# Patient Record
Sex: FEMALE | Race: WHITE | NOT HISPANIC OR LATINO | RURAL
[De-identification: names, ages, dates, MRNs, and addresses within clinical notes are randomized per-mention and may not be internally consistent; named-entity substitution may affect disease eponyms.]

---

## 2017-08-29 ENCOUNTER — OFFICE VISIT (OUTPATIENT)
Dept: RETAIL CLINIC | Facility: CLINIC | Age: 52
End: 2017-08-29

## 2017-08-29 DIAGNOSIS — R50.9 FEVER, UNSPECIFIED FEVER CAUSE: Primary | ICD-10-CM

## 2017-08-29 DIAGNOSIS — J40 BRONCHITIS: ICD-10-CM

## 2017-08-29 PROCEDURE — 99213 OFFICE O/P EST LOW 20 MIN: CPT | Performed by: NURSE PRACTITIONER

## 2017-08-29 RX ORDER — PREDNISONE 1 MG/1
TABLET ORAL
Qty: 21 TABLET | Refills: 0 | Status: SHIPPED | OUTPATIENT
Start: 2017-08-29 | End: 2017-09-03

## 2017-08-29 RX ORDER — AZITHROMYCIN 250 MG/1
TABLET, FILM COATED ORAL
Qty: 6 TABLET | Refills: 0 | Status: SHIPPED | OUTPATIENT
Start: 2017-08-29 | End: 2019-05-02

## 2017-08-29 NOTE — PROGRESS NOTES
Subjective   Yanira Nieto is a 52 y.o. female.   There were no vitals taken for this visit.      Cough   This is a new problem. The current episode started in the past 7 days. The problem has been gradually worsening. The problem occurs every few minutes. The cough is productive of purulent sputum. Associated symptoms include chills, ear congestion, a fever, nasal congestion, postnasal drip, rhinorrhea, a sore throat (mild), shortness of breath and wheezing. Pertinent negatives include no chest pain, ear pain, headaches, heartburn, hemoptysis, myalgias, rash, sweats or weight loss.        The following portions of the patient's history were reviewed and updated as appropriate: allergies, current medications, past family history, past medical history, past social history, past surgical history and problem list.    Review of Systems   Constitutional: Positive for chills and fever. Negative for weight loss.   HENT: Positive for postnasal drip, rhinorrhea and sore throat (mild). Negative for ear pain.    Respiratory: Positive for cough, shortness of breath and wheezing. Negative for hemoptysis.    Cardiovascular: Negative for chest pain.   Gastrointestinal: Negative for heartburn.   Musculoskeletal: Negative for myalgias.   Skin: Negative for rash.   Neurological: Negative for headaches.       Objective   Physical Exam   Constitutional: She appears well-developed and well-nourished.  Non-toxic appearance. She appears ill.   HENT:   Head: Normocephalic and atraumatic.   Right Ear: Tympanic membrane and ear canal normal.   Left Ear: Tympanic membrane and ear canal normal.   Nose: Mucosal edema and rhinorrhea present. Right sinus exhibits no maxillary sinus tenderness and no frontal sinus tenderness. Left sinus exhibits no maxillary sinus tenderness and no frontal sinus tenderness.   Cardiovascular: Regular rhythm and normal heart sounds.    Pulmonary/Chest: Effort normal. She has wheezes. She has rhonchi. She has no  rales.   Lymphadenopathy:     She has no cervical adenopathy.   Skin: Skin is warm and dry.       Assessment/Plan   Diagnoses and all orders for this visit:    Fever, unspecified fever cause    Bronchitis    Other orders  -     azithromycin (ZITHROMAX Z-NAHED) 250 MG tablet; Take 2 tablets the first day, then 1 tablet daily for 4 days.  -     predniSONE (DELTASONE) 5 MG tablet; 6/5/4/3/2/1

## 2019-05-02 ENCOUNTER — OFFICE VISIT (OUTPATIENT)
Dept: RETAIL CLINIC | Facility: CLINIC | Age: 54
End: 2019-05-02

## 2019-05-02 VITALS
OXYGEN SATURATION: 100 % | WEIGHT: 129.4 LBS | TEMPERATURE: 97 F | BODY MASS INDEX: 20.79 KG/M2 | SYSTOLIC BLOOD PRESSURE: 112 MMHG | DIASTOLIC BLOOD PRESSURE: 76 MMHG | RESPIRATION RATE: 12 BRPM | HEART RATE: 60 BPM | HEIGHT: 66 IN

## 2019-05-02 DIAGNOSIS — R09.81 SINUS CONGESTION: Primary | ICD-10-CM

## 2019-05-02 DIAGNOSIS — J02.9 SORETHROAT: ICD-10-CM

## 2019-05-02 LAB
EXPIRATION DATE: NORMAL
INTERNAL CONTROL: NORMAL
Lab: NORMAL
S PYO AG THROAT QL: NEGATIVE

## 2019-05-02 PROCEDURE — 99213 OFFICE O/P EST LOW 20 MIN: CPT | Performed by: NURSE PRACTITIONER

## 2019-05-02 PROCEDURE — 87880 STREP A ASSAY W/OPTIC: CPT | Performed by: NURSE PRACTITIONER

## 2019-05-02 RX ORDER — PREDNISONE 10 MG/1
TABLET ORAL DAILY
Qty: 21 EACH | Refills: 0 | Status: SHIPPED | OUTPATIENT
Start: 2019-05-02 | End: 2019-05-08

## 2019-05-02 RX ORDER — PSEUDOEPHEDRINE HCL 120 MG/1
120 TABLET, FILM COATED, EXTENDED RELEASE ORAL EVERY 12 HOURS
Qty: 20 TABLET | Refills: 0 | Status: SHIPPED | OUTPATIENT
Start: 2019-05-02 | End: 2019-05-12

## 2019-05-02 NOTE — PROGRESS NOTES
"Rodríguez Nieto is a 54 y.o. female.     Sore Throat    This is a new problem. Episode onset: 2 days. The problem has been rapidly worsening. There has been no fever. The pain is severe. Associated symptoms include congestion, a hoarse voice and trouble swallowing. Pertinent negatives include no abdominal pain, coughing, diarrhea, ear pain, headaches, neck pain, shortness of breath, stridor, swollen glands or vomiting. She has had no exposure to strep or mono. She has tried NSAIDs for the symptoms. The treatment provided mild relief.        The following portions of the patient's history were reviewed and updated as appropriate: allergies, current medications, past medical history, past social history, past surgical history and problem list.    Review of Systems   Constitutional: Negative for appetite change, fatigue and fever.   HENT: Positive for congestion, hoarse voice, postnasal drip, rhinorrhea, sore throat and trouble swallowing. Negative for ear pain, sinus pressure and sneezing.    Eyes: Negative.    Respiratory: Negative.  Negative for cough, shortness of breath, wheezing and stridor.    Cardiovascular: Negative.    Gastrointestinal: Negative for abdominal pain, diarrhea, nausea and vomiting.   Musculoskeletal: Negative.  Negative for myalgias and neck pain.   Skin: Negative.    Neurological: Negative for headaches.   Hematological: Negative for adenopathy.   Psychiatric/Behavioral: Negative.         /76   Pulse 60   Temp 97 °F (36.1 °C)   Resp 12   Ht 167.6 cm (66\")   Wt 58.7 kg (129 lb 6.4 oz)   SpO2 100%   BMI 20.89 kg/m²      Objective   Physical Exam   Constitutional: She is oriented to person, place, and time. Vital signs are normal. She appears well-developed and well-nourished. No distress.   HENT:   Head: Normocephalic.   Right Ear: External ear and ear canal normal. No drainage, swelling or tenderness. Tympanic membrane is bulging. Tympanic membrane is not erythematous. "   Left Ear: External ear and ear canal normal. No drainage, swelling or tenderness. Tympanic membrane is bulging. Tympanic membrane is not erythematous.   Nose: Mucosal edema (severe) and rhinorrhea present. Right sinus exhibits no maxillary sinus tenderness and no frontal sinus tenderness. Left sinus exhibits no maxillary sinus tenderness and no frontal sinus tenderness.   Mouth/Throat: Uvula is midline and mucous membranes are normal. Posterior oropharyngeal erythema present. Tonsils are 0 on the right. Tonsils are 0 on the left. No tonsillar exudate.   Neck: Normal range of motion. Neck supple.   Cardiovascular: Normal rate, regular rhythm, S1 normal, S2 normal and normal heart sounds.   Pulmonary/Chest: Effort normal and breath sounds normal. No stridor. No respiratory distress. She has no decreased breath sounds. She has no wheezes. She has no rhonchi. She has no rales.   Abdominal: Soft. Bowel sounds are normal. She exhibits no distension. There is no tenderness. There is no rebound and no guarding.   Lymphadenopathy:        Head (right side): No tonsillar adenopathy present.        Head (left side): No tonsillar adenopathy present.     She has no cervical adenopathy.   Neurological: She is alert and oriented to person, place, and time.   Skin: Skin is warm and dry. No rash noted. She is not diaphoretic.   Psychiatric: She has a normal mood and affect. Her speech is normal and behavior is normal. Thought content normal.   Vitals reviewed.       Results for orders placed or performed in visit on 05/02/19   POC Rapid Strep A   Result Value Ref Range    Rapid Strep A Screen Negative Negative, VALID, INVALID, Not Performed    Internal Control Passed Passed    Lot Number [id number]     Expiration Date 08/31/2020         Assessment/Plan   Yanira was seen today for sore throat.    Diagnoses and all orders for this visit:    Sinus congestion  -     pseudoephedrine (SUDAFED) 120 MG 12 hr tablet; Take 1 tablet by mouth  Every 12 (Twelve) Hours for 10 days.    Sorethroat  -     POC Rapid Strep A  -     predniSONE (DELTASONE) 10 MG (21) tablet pack; Take  by mouth Daily for 6 days. Use as directed on package

## 2019-12-18 ENCOUNTER — OFFICE VISIT (OUTPATIENT)
Dept: RETAIL CLINIC | Facility: CLINIC | Age: 54
End: 2019-12-18

## 2019-12-18 VITALS
WEIGHT: 134 LBS | RESPIRATION RATE: 16 BRPM | HEART RATE: 94 BPM | BODY MASS INDEX: 21.53 KG/M2 | TEMPERATURE: 99.4 F | OXYGEN SATURATION: 99 % | HEIGHT: 66 IN

## 2019-12-18 DIAGNOSIS — R05.3 COUGH, PERSISTENT: ICD-10-CM

## 2019-12-18 DIAGNOSIS — J98.01 BRONCHOSPASM: Primary | ICD-10-CM

## 2019-12-18 LAB
EXPIRATION DATE: NORMAL
FLUAV AG NPH QL: NEGATIVE
FLUBV AG NPH QL: NEGATIVE
INTERNAL CONTROL: NORMAL
Lab: NORMAL

## 2019-12-18 PROCEDURE — 99213 OFFICE O/P EST LOW 20 MIN: CPT | Performed by: NURSE PRACTITIONER

## 2019-12-18 PROCEDURE — 87804 INFLUENZA ASSAY W/OPTIC: CPT | Performed by: NURSE PRACTITIONER

## 2019-12-18 RX ORDER — AZITHROMYCIN 250 MG/1
TABLET, FILM COATED ORAL
Qty: 6 TABLET | Refills: 0 | Status: SHIPPED | OUTPATIENT
Start: 2019-12-18

## 2019-12-18 RX ORDER — PREDNISONE 10 MG/1
TABLET ORAL
Qty: 21 TABLET | Refills: 0 | Status: SHIPPED | OUTPATIENT
Start: 2019-12-18

## 2019-12-18 RX ORDER — ALBUTEROL SULFATE 90 UG/1
2 AEROSOL, METERED RESPIRATORY (INHALATION) EVERY 4 HOURS PRN
Qty: 1 INHALER | Refills: 0 | Status: SHIPPED | OUTPATIENT
Start: 2019-12-18

## 2019-12-18 NOTE — PROGRESS NOTES
"Rodríguez Nieto is a 54 y.o. female.   Pulse 94   Temp 99.4 °F (37.4 °C)   Resp 16   Ht 167.6 cm (66\")   Wt 60.8 kg (134 lb)   SpO2 99%   BMI 21.63 kg/m²   Past Medical History:   Diagnosis Date   • Allergic        Allergies   Allergen Reactions   • Penicillins Hives         URI    This is a new problem. The current episode started in the past 7 days. The problem has been gradually worsening. There has been no fever. Associated symptoms include congestion, coughing, rhinorrhea and wheezing. Pertinent negatives include no abdominal pain, chest pain, diarrhea, dysuria, ear pain, headaches, joint pain, joint swelling, nausea, neck pain, plugged ear sensation, rash, sinus pain, sneezing, sore throat, swollen glands or vomiting.        The following portions of the patient's history were reviewed and updated as appropriate: allergies, current medications, past family history, past medical history, past social history, past surgical history and problem list.    Review of Systems   HENT: Positive for congestion and rhinorrhea. Negative for ear pain, sinus pain, sneezing and sore throat.    Respiratory: Positive for cough and wheezing.    Cardiovascular: Negative for chest pain.   Gastrointestinal: Negative for abdominal pain, diarrhea, nausea and vomiting.   Genitourinary: Negative for dysuria.   Musculoskeletal: Negative for joint pain and neck pain.   Skin: Negative for rash.   Neurological: Negative for headaches.       Objective   Physical Exam   Constitutional: She appears well-developed and well-nourished.  Non-toxic appearance. She appears ill.   HENT:   Head: Normocephalic and atraumatic.   Right Ear: Tympanic membrane and ear canal normal.   Left Ear: Tympanic membrane and ear canal normal.   Nose: Mucosal edema and rhinorrhea present. Right sinus exhibits no maxillary sinus tenderness and no frontal sinus tenderness. Left sinus exhibits no maxillary sinus tenderness and no frontal sinus " tenderness.   Mouth/Throat: Uvula is midline, oropharynx is clear and moist and mucous membranes are normal.   Cardiovascular: Regular rhythm and normal heart sounds.   Pulmonary/Chest: Effort normal. She has no wheezes. She has no rhonchi. She has no rales.   Lymphadenopathy:     She has no cervical adenopathy.   Skin: Skin is warm and dry.       Assessment/Plan   Diagnoses and all orders for this visit:    Bronchospasm  -     POC Influenza A / B    Cough, persistent    Other orders  -     predniSONE (DELTASONE) 10 MG tablet; 6/5/4/3/2/1 As directed PO  -     albuterol sulfate  (90 Base) MCG/ACT inhaler; Inhale 2 puffs Every 4 (Four) Hours As Needed for Wheezing or Shortness of Air.  -     azithromycin (ZITHROMAX Z-NAHED) 250 MG tablet; Take 2 tablets the first day, then 1 tablet daily for 4 days.      Results for orders placed or performed in visit on 12/18/19   POC Influenza A / B   Result Value Ref Range    Rapid Influenza A Ag Negative Negative    Rapid Influenza B Ag Negative Negative    Internal Control Passed Passed    Lot Number [id number]     Expiration Date 6/21          Pt advised to start the prednisone and inhaler today. ONLY start the azithromycin if symptoms worsen or do not improve after 7-10 days.

## 2019-12-18 NOTE — PATIENT INSTRUCTIONS
Bronchospasm, Adult    Bronchospasm is when airways in the lungs get smaller. When this happens, it can be hard to breathe. You may cough. You may also make a whistling sound when you breathe (wheeze).  Follow these instructions at home:  Medicines  · Take over-the-counter and prescription medicines only as told by your doctor.  · If you need to use an inhaler or nebulizer to take your medicine, ask your doctor how to use it.  · If you were given a spacer, always use it with your inhaler.  Lifestyle  · Change your heating and air conditioning filter. Do this at least once a month.  · Try not to use fireplaces and wood stoves.  · Do not  smoke. Do not  allow smoking in your home.  · Try not to use things that have a strong smell, like perfume.  · Get rid of pests (such as roaches and mice) and their poop.  · Remove any mold from your home.  · Keep your house clean. Get rid of dust.  · Use cleaning products that have no smell.   · Replace carpet with wood, tile, or vinyl dianna.  · Use allergy-proof pillows, mattress covers, and box spring covers.  · Wash bed sheets and blankets every week. Use hot water. Dry them in a dryer.  · Use blankets that are made of polyester or cotton.  · Wash your hands often.  · Keep pets out of your bedroom.  · When you exercise, try not to breathe in cold air.  General instructions  · Have a plan for getting medical care. Know these things:  ? When to call your doctor.  ? When to call local emergency services (911 in the [place]  ? Where to go in an emergency.  · Stay up to date on your shots (immunizations).  · When you have an episode:  ? Stay calm.  ? Relax.  ? Breathe slowly.  Contact a doctor if:  · Your muscles ache.  · Your chest hurts.  · The color of the mucus you cough up (sputum) changes from clear or white to yellow, green, gray, or bloody.  · The mucus you cough up gets thicker.  · You have a fever.  Get help right away if:  · The whistling sound gets worse, even after you  take your medicines.  · Your coughing gets worse.  · You find it even harder to breathe.  · Your chest hurts very much.  Summary  · Bronchospasm is when airways in the lungs get smaller.  · When this happens, it can be hard to breathe. You may cough. You may also make a whistling sound when you breathe.  · Stay away from things that cause you to have episodes. These include smoke or dust.  This information is not intended to replace advice given to you by your health care provider. Make sure you discuss any questions you have with your health care provider.  Document Released: 10/15/2010 Document Revised: 12/21/2017 Document Reviewed: 12/21/2017  [employer] Interactive Patient Education © 2019 [employer]    Asthma, Adult    Asthma is a long-term (chronic) condition that causes recurrent episodes in which the airways become tight and narrow. The airways are the passages that lead from the nose and mouth down into the lungs. Asthma episodes, also called asthma attacks, can cause coughing, wheezing, shortness of breath, and chest pain. The airways can also fill with mucus. During an attack, it can be difficult to breathe. Asthma attacks can range from minor to life threatening.  Asthma cannot be cured, but medicines and lifestyle changes can help control it and treat acute attacks.  What are the causes?  This condition is believed to be caused by inherited (genetic) and environmental factors, but its exact cause is not known.  There are many things that can bring on an asthma attack or make asthma symptoms worse (triggers). Asthma triggers are different for each person. Common triggers include:  · Mold.  · Dust.  · Cigarette smoke.  · Cockroaches.  · Things that can cause allergy symptoms (allergens), such as animal dander or pollen from trees or grass.  · Air pollutants such as household , wood smoke, smog, or chemical odors.  · Cold air, weather changes, and winds (which increase molds and pollen in the  air).  · Strong emotional expressions such as crying or laughing hard.  · Stress.  · Certain medicines (such as aspirin) or types of medicines (such as beta-blockers).  · Sulfites in foods and drinks. Foods and drinks that may contain sulfites include dried fruit, potato chips, and sparkling grape juice.  · Infections or inflammatory conditions such as the flu, a cold, or inflammation of the nasal membranes (rhinitis).  · Gastroesophageal reflux disease (GERD).  · Exercise or strenuous activity.  What are the signs or symptoms?  Symptoms of this condition may occur right after asthma is triggered or many hours later. Symptoms include:  · Wheezing. This can sound like whistling when you breathe.  · Excessive nighttime or early morning coughing.  · Frequent or severe coughing with a common cold.  · Chest tightness.  · Shortness of breath.  · Tiredness (fatigue) with minimal activity.  How is this diagnosed?  This condition is diagnosed based on:  · Your medical history.  · A physical exam.  · Tests, which may include:  ? Lung function studies and pulmonary studies (spirometry). These tests can evaluate the flow of air in your lungs.  ? Allergy tests.  ? Imaging tests, such as X-rays.  How is this treated?  There is no cure for this condition, but treatment can help control your symptoms. Treatment for asthma usually involves:  · Identifying and avoiding your asthma triggers.  · Using medicines to control your symptoms. Generally, two types of medicines are used to treat asthma:  ? Controller medicines. These help prevent asthma symptoms from occurring. They are usually taken every day.  ? Fast-acting reliever or rescue medicines. These quickly relieve asthma symptoms by widening the narrow and tight airways. They are used as needed and provide short-term relief.  · Using supplemental oxygen. This may be needed during a severe episode.  · Using other medicines, such as:  ? Allergy medicines, such as antihistamines, if  your asthma attacks are triggered by allergens.  ? Immune medicines (immunomodulators). These are medicines that help control the immune system.  · Creating an asthma action plan. An asthma action plan is a written plan for managing and treating your asthma attacks. This plan includes:  ? A list of your asthma triggers and how to avoid them.  ? Information about when medicines should be taken and when their dosage should be changed.  ? Instructions about using a device called a peak flow meter. A peak flow meter measures how well the lungs are working and the severity of your asthma. It helps you monitor your condition.  Follow these instructions at home:  Controlling your home environment  Control your home environment in the following ways to help avoid triggers and prevent asthma attacks:  · Change your heating and air conditioning filter regularly.  · Limit your use of fireplaces and wood stoves.  · Get rid of pests (such as roaches and mice) and their droppings.  · Throw away plants if you see mold on them.  · Clean floors and dust surfaces regularly. Use unscented cleaning products.  · Try to have someone else vacuum for you regularly. Stay out of rooms while they are being vacuumed and for a short while afterward. If you vacuum, use a dust mask from a hardware store, a double-layered or microfilter vacuum  bag, or a vacuum  with a HEPA filter.  · Replace carpet with wood, tile, or vinyl dianna. Carpet can trap dander and dust.  · Use allergy-proof pillows, mattress covers, and box spring covers.  · Keep your bedroom a trigger-free room.  · Avoid pets and keep windows closed when allergens are in the air.  · Wash beddings every week in hot water and dry them in a dryer.  · Use blankets that are made of polyester or cotton.  · Clean bathrooms and deangelo with bleach. If possible, have someone repaint the walls in these rooms with mold-resistant paint. Stay out of the rooms that are being  cleaned and painted.  · Wash your hands often with soap and water. If soap and water are not available, use hand .  · Do not allow anyone to smoke in your home.  General instructions  · Take over-the-counter and prescription medicines only as told by your health care provider.  ? Speak with your health care provider if you have questions about how or when to take the medicines.  ? Make note if you are requiring more frequent dosages.  · Do not use any products that contain nicotine or tobacco, such as cigarettes and e-cigarettes. If you need help quitting, ask your health care provider. Also, avoid being exposed to secondhand smoke.  · Use a peak flow meter as told by your health care provider. Record and keep track of the readings.  · Understand and use the asthma action plan to help minimize, or stop an asthma attack, without needing to seek medical care.  · Make sure you stay up to date on your yearly vaccinations as told by your health care provider. This may include vaccines for the flu and pneumonia.  · Avoid outdoor activities when allergen counts are high and when air quality is low.  · Wear a ski mask that covers your nose and mouth during outdoor winter activities. Exercise indoors on cold days if you can.  · Warm up before exercising, and take time for a cool-down period after exercise.  · Keep all follow-up visits as told by your health care provider. This is important.  Where to find more information  · For information about asthma, turn to the Centers for Disease Control and Prevention at www.cdc.gov/asthma/faqs.htm  · For air quality information, turn to [employer] at https://airnow.gov/  Contact a health care provider if:  · You have wheezing, shortness of breath, or a cough even while you are taking medicine to prevent attacks.  · The mucus you cough up (sputum) is thicker than usual.  · Your sputum changes from clear or white to yellow, green, gray, or bloody.  · Your medicines are causing  side effects, such as a rash, itching, swelling, or trouble breathing.  · You need to use a reliever medicine more than 2-3 times a week.  · Your peak flow reading is still at 50-79% of your personal best after following your action plan for 1 hour.  · You have a fever.  Get help right away if:  · You are getting worse and do not respond to treatment during an asthma attack.  · You are short of breath when at rest or when doing very little physical activity.  · You have difficulty eating, drinking, or talking.  · You have chest pain or tightness.  · You develop a fast heartbeat or palpitations.  · You have a bluish color to your lips or fingernails.  · You are light-headed or dizzy, or you faint.  · Your peak flow reading is less than 50% of your personal best.  · You feel too tired to breathe normally.  Summary  · Asthma is a long-term (chronic) condition that causes recurrent episodes in which the airways become tight and narrow. These episodes can cause coughing, wheezing, shortness of breath, and chest pain.  · Asthma cannot be cured, but medicines and lifestyle changes can help control it and treat acute attacks.  · Make sure you understand how to avoid triggers and how and when to use your medicines.  · Asthma attacks can range from minor to life threatening. Get help right away if you have an asthma attack and do not respond to treatment with your usual rescue medicines.  This information is not intended to replace advice given to you by your health care provider. Make sure you discuss any questions you have with your health care provider.  Document Released: 12/18/2006 Document Revised: 01/22/2018 Document Reviewed: 01/22/2018  [employer] Interactive Patient Education © 2019 [employer]

## 2020-11-11 ENCOUNTER — HOSPITAL ENCOUNTER (EMERGENCY)
Age: 55
Discharge: HOME | End: 2020-11-11
Payer: COMMERCIAL

## 2020-11-11 VITALS — BODY MASS INDEX: 21.7 KG/M2

## 2020-11-11 VITALS
OXYGEN SATURATION: 97 % | RESPIRATION RATE: 19 BRPM | DIASTOLIC BLOOD PRESSURE: 98 MMHG | HEART RATE: 102 BPM | TEMPERATURE: 98.42 F | SYSTOLIC BLOOD PRESSURE: 126 MMHG

## 2020-11-11 VITALS
DIASTOLIC BLOOD PRESSURE: 98 MMHG | TEMPERATURE: 98.4 F | OXYGEN SATURATION: 97 % | HEART RATE: 102 BPM | RESPIRATION RATE: 19 BRPM | SYSTOLIC BLOOD PRESSURE: 126 MMHG

## 2020-11-11 DIAGNOSIS — R05: Primary | ICD-10-CM

## 2020-11-11 DIAGNOSIS — Z20.828: ICD-10-CM

## 2020-11-11 DIAGNOSIS — R50.9: ICD-10-CM

## 2020-11-11 PROCEDURE — U0004 COV-19 TEST NON-CDC HGH THRU: HCPCS

## 2020-11-11 PROCEDURE — 99201: CPT

## 2023-02-10 PROCEDURE — 93010 ELECTROCARDIOGRAM REPORT: CPT | Performed by: INTERNAL MEDICINE

## 2023-02-23 ENCOUNTER — OUTSIDE FACILITY SERVICE (OUTPATIENT)
Dept: CARDIOLOGY | Facility: CLINIC | Age: 58
End: 2023-02-23
Payer: COMMERCIAL

## 2024-05-16 ENCOUNTER — OFFICE VISIT (OUTPATIENT)
Dept: CARDIAC SURGERY | Facility: CLINIC | Age: 59
End: 2024-05-16
Payer: COMMERCIAL

## 2024-05-16 VITALS
TEMPERATURE: 99.3 F | HEIGHT: 66 IN | OXYGEN SATURATION: 95 % | HEART RATE: 90 BPM | DIASTOLIC BLOOD PRESSURE: 90 MMHG | SYSTOLIC BLOOD PRESSURE: 140 MMHG | BODY MASS INDEX: 21.63 KG/M2

## 2024-05-16 DIAGNOSIS — R05.3 CHRONIC COUGH: Primary | ICD-10-CM

## 2024-05-16 DIAGNOSIS — R49.0 HOARSENESS: ICD-10-CM

## 2024-05-16 PROCEDURE — 99203 OFFICE O/P NEW LOW 30 MIN: CPT | Performed by: THORACIC SURGERY (CARDIOTHORACIC VASCULAR SURGERY)

## 2024-05-16 RX ORDER — MONTELUKAST SODIUM 10 MG/1
1 TABLET ORAL DAILY
COMMUNITY
Start: 2024-03-16

## 2024-05-16 RX ORDER — LOSARTAN POTASSIUM 50 MG/1
50 TABLET ORAL DAILY
COMMUNITY

## 2024-05-16 NOTE — PROGRESS NOTES
05/16/2024  Patient Information  Yanira Nieto                                                                                          [address] [facility] [address]   1965  'PCP/Referring Physician'  Alton Melton MD  [phone]  No ref. provider found    Chief Complaint   Patient presents with    Consult     NP per Dr. Melton for Chronic Cough, Hoarseness, Left Hand Numbness and Myalgais-Hx of Histoplasmosis       History of Present Illness:   The patient is a 59-year-old  female who has a long history of chronic cough and also has hoarseness.  She has had a history of histoplasmosis.  She does not smoke.  She also carries a diagnosis of asthma and has been on Singulair in the past.  She is getting very tired and not being able to speak without coughing.  She is here for evaluation today.  She denies hemoptysis, weight loss and she denies exposure to infectious disease.  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  She has no family history of anything like this.  She does have a history of COVID several months ago.      There is no problem list on file for this patient.    Past Medical History:   Diagnosis Date    Allergic     Asthma     Cough     Histoplasmosis     History of blood transfusion     Hypertension     Hypothyroidism     Mitral valve prolapse     Skin cancer      Past Surgical History:   Procedure Laterality Date    ADENOIDECTOMY      CHOLECYSTECTOMY      DILATION AND CURETTAGE, DIAGNOSTIC / THERAPEUTIC      ESSURE TUBAL LIGATION      TONSILLECTOMY         Current Outpatient Medications:     albuterol sulfate  (90 Base) MCG/ACT inhaler, Inhale 2 puffs Every 4 (Four) Hours As Needed for Wheezing or Shortness of Air., Disp: 1 inhaler, Rfl: 0    Fluticasone-Salmeterol (ADVAIR HFA IN), Inhale., Disp: , Rfl:     losartan (COZAAR) 50 MG tablet, Take 1 tablet by mouth Daily., Disp: , Rfl:     montelukast (SINGULAIR) 10 MG tablet, Take 1 tablet by mouth  Daily., Disp: , Rfl:     azithromycin (ZITHROMAX Z-NAHED) 250 MG tablet, Take 2 tablets the first day, then 1 tablet daily for 4 days. (Patient not taking: Reported on 5/16/2024), Disp: 6 tablet, Rfl: 0    predniSONE (DELTASONE) 10 MG tablet, 6/5/4/3/2/1 As directed PO (Patient not taking: Reported on 5/16/2024), Disp: 21 tablet, Rfl: 0  Allergies   Allergen Reactions    Penicillins Hives     Social History     Socioeconomic History    Marital status: Unknown    Number of children: 4   Tobacco Use    Smoking status: Never    Smokeless tobacco: Never   Vaping Use    Vaping status: Never Used   Substance and Sexual Activity    Alcohol use: Yes     Comment: socially    Drug use: Never     Family History   Problem Relation Age of Onset    Coronary artery disease Mother     Hypertension Mother     Heart disease Mother     Coronary artery disease Father     Heart disease Father      Review of Systems   Constitutional: Positive for malaise/fatigue. Negative for chills, fever, night sweats and weight loss.   HENT:  Negative for hearing loss, odynophagia and sore throat.    Cardiovascular:  Positive for dyspnea on exertion and palpitations. Negative for chest pain, leg swelling and orthopnea.   Respiratory:  Positive for cough and shortness of breath. Negative for hemoptysis.    Endocrine: Negative for cold intolerance, heat intolerance, polydipsia, polyphagia and polyuria.   Hematologic/Lymphatic: Bruises/bleeds easily.   Skin:  Negative for itching and rash.   Musculoskeletal:  Positive for back pain, joint pain and myalgias. Negative for joint swelling.   Gastrointestinal:  Positive for constipation. Negative for abdominal pain, diarrhea, hematemesis, hematochezia, melena, nausea and vomiting.   Genitourinary:  Positive for flank pain (left sided recently). Negative for dysuria, frequency and hematuria.   Neurological:  Negative for focal weakness, headaches, numbness and seizures.   Psychiatric/Behavioral:  Negative for  "suicidal ideas.    All other systems reviewed and are negative.    Vitals:    05/16/24 1112   BP: 140/90   BP Location: Left arm   Patient Position: Sitting   Pulse: 90   Temp: 99.3 °F (37.4 °C)   SpO2: 95%   Height: 167.6 cm (66\")      Physical Exam  Vitals and nursing note reviewed.   Constitutional:       General: She is not in acute distress.     Appearance: She is well-developed.   HENT:      Head: Normocephalic.   Eyes:      Conjunctiva/sclera: Conjunctivae normal.      Pupils: Pupils are equal, round, and reactive to light.   Neck:      Thyroid: No thyroid mass or thyromegaly.   Cardiovascular:      Rate and Rhythm: Normal rate.      Heart sounds: No murmur heard.     No friction rub. No gallop.   Pulmonary:      Breath sounds: No wheezing, rhonchi or rales.   Abdominal:      General: Bowel sounds are normal. There is no distension.      Palpations: Abdomen is soft. There is no mass.      Tenderness: There is no abdominal tenderness.   Musculoskeletal:         General: No deformity. Normal range of motion.      Cervical back: Normal range of motion.   Skin:     Findings: No petechiae.      Nails: There is no clubbing.   Neurological:      Mental Status: She is oriented to person, place, and time.      Cranial Nerves: No cranial nerve deficit.      Sensory: No sensory deficit.   Psychiatric:         Behavior: Behavior normal.         The ROS, past medical history, surgical history, family history, social history, and vitals were reviewed by myself and corrected as needed.      Labs/Imaging:  I have obtained and reviewed the medical records from Dr. Melton.    Assessment/Plan:   The patient is a 59-year-old female who has had a long history of coughing and hoarseness.  She apparently has been told she has asthma in the past and has been placed on Singulair.  She does have a history of having COVID.  She is getting so that she can barely talk because of her hoarseness and coughing when she tries to talk.  She, " apparently, was seen by an ENT doctor in [place] and was told she did not have polyps on her cords.  At this point, after history and examination, I think the best option would be to go and have allergy testing and see an ENT here.  If this does not illuminate the problem then I would suggest we refer her to pulmonary that would require bronchoscopy and other testing.  I explained all of this to her in detail and answered all of her questions.  She is happy with this plan.  The patient does not have an advance directive on file at this time.  I would like to thank you for this consultation.    There is no problem list on file for this patient.      CC: MD Concha Dooley editing for Gordon Arroyo MD       I, Gordon Arroyo MD, have read and agree with the editing done by Concha Perez, .

## 2024-05-23 ENCOUNTER — TELEPHONE (OUTPATIENT)
Dept: CARDIAC SURGERY | Facility: CLINIC | Age: 59
End: 2024-05-23

## 2024-05-23 NOTE — TELEPHONE ENCOUNTER
Caller: Imlay, Dana    Relationship: Self    Best call back number: [phone]     What is the best time to reach you: ANY    Who are you requesting to speak with (clinical staff, provider,  specific staff member): REFERRAL STAFF    Do you know the name of the person who called: PT    What was the call regarding: PT WAS IN OFFICE 5/16/24-DR GOMEZ ADVISED SHE NEEDED TO SEE ENT W/I BH AND ALLERGY TEST. SHE HAS NOT HEARD ANYTHING-NO REF IN [facility]. PLEASE CALL PT WHEN APPT ARE MADE AND/OR MAXINE.    Is it okay if the provider responds through [facility]: NO  MAY LEAVE A MESSAGE IF NO ANSWER

## 2024-05-24 NOTE — TELEPHONE ENCOUNTER
LVM FOR PT. REFERRAL WAS SENT 05/17/24. [facility] VERIFIED THAT THEY RECEIVED THE REFERRAL AND ARE WORKING ON IT.

## 2024-05-29 NOTE — TELEPHONE ENCOUNTER
Caller: Yanira Nieto    Relationship: Self    Best call back number: [phone]     What is the best time to reach you: ANY     Who are you requesting to speak with (clinical staff, provider,  specific staff member): ANY     Do you know the name of the person who called: BERTA     What was the call regarding: SCHEDULING     Is it okay if the provider responds through MyChart: PATIENT WOULD LIKE A CALL BACK.

## 2024-06-10 ENCOUNTER — DOCUMENTATION (OUTPATIENT)
Dept: SLEEP MEDICINE | Age: 59
End: 2024-06-10
Payer: COMMERCIAL

## 2024-06-10 NOTE — PROGRESS NOTES
Received call from Dr. Melton  Consult needed for possible atrial fibrillation  Message to schedulers.

## 2024-06-18 NOTE — PROGRESS NOTES
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, Nancy Bernard  Previous cardiology: RASHIDA Tucker (~20 years)  PROBLEM LIST:   Palpitations  HTN  HLD  History of mitral valve prolapse  History of histoplasmosis   Chronic cough/hoarseness  Negative ENT eval   CXR: no acute findings  Asthma  COVID early   Hypothyroidism     Surgical history:  Tonsillectomy/adenoidectomy   Cholecystectomy   Tubal ligation  C section  D&C    Cardiac Risk Factors: family history of premature cardiovascular disease, hypertension, and sedentary lifestyle    CC:  Chief Complaint   Patient presents with    Irregular Heart Beat     New Patient       Allergies  Allergies   Allergen Reactions    Penicillins Hives       Current Medications  Current Outpatient Medications   Medication Instructions    albuterol sulfate  (90 Base) MCG/ACT inhaler 2 puffs, Inhalation, Every 4 Hours PRN    Fluticasone-Salmeterol (ADVAIR HFA IN) Inhalation, As Needed    losartan (COZAAR) 50 mg, Oral, Daily    montelukast (SINGULAIR) 10 MG tablet 1 tablet, Oral, Daily        History of Present Illness   HPI  Yanira Nieto is a 59 y.o. year old female with the above mentioned PMH who presents for consult from Alton Melton MD for evaluation of tachycardia.  She had previous evaluation with Dr. Tucker 20+ years ago and was told of MVP.  She was also placed on atenolol for tachycardia at that time.  She did not like how the atenolol made her feel and this was discontinued.  She wears an Apple Watch that tells her heart rate is elevated and at times rhythm is A-fib. Episodes occur daily and last several minutes.  She has no associated chest pain, shortness of breath, and/or tachypalpitations. She does not  "follow home blood pressures but was placed on losartan 1 year ago.  She can occasionally feel her heart beating in her years when she lays down at night.  She denies any exertional symptoms.  She admits to not drinking enough water.  No excess caffeine or alcohol.  No symptoms suggesting AVINASH.  Family history is significant for atrial fibrillation, valve disease, heart disease.  She is also dealt with chronic \"hoarseness\" and cough and has further evaluation scheduled with ENT.    Pt denies any chest pain, dyspnea at rest, dyspnea on exertion, orthopnea, PND, palpitations, lower extremity edema, or claudication. Pt denies history of CHF, DVT, PE, MI, CVA, TIA, or rheumatic fever.     ROS  Review of Systems   HENT:  Positive for hoarse voice.    Cardiovascular:  Positive for irregular heartbeat.        Fast heart rate   Respiratory:  Positive for cough.    Endocrine: Positive for polydipsia.   All other systems reviewed and are negative.      SOCIAL HX  Social History     Socioeconomic History    Marital status: Single    Number of children: 4   Tobacco Use    Smoking status: Never    Smokeless tobacco: Never   Vaping Use    Vaping status: Never Used   Substance and Sexual Activity    Alcohol use: Yes     Comment: socially    Drug use: Never       FAMILY HX  Family History   Problem Relation Age of Onset    Coronary artery disease Mother     Hypertension Mother     Heart disease Mother     Coronary artery disease Father     Heart disease Father        Vitals:    06/19/24 1027 06/19/24 1128   BP: 142/98 (!) 160/108   BP Location: Left arm Left arm   Patient Position: Sitting Sitting   Pulse: 94    SpO2: 98%    Weight: 63.5 kg (140 lb)    Height: 167.6 cm (66\")      Body mass index is 22.6 kg/m².     PHYSICAL EXAMINATION:  Constitutional:       Appearance: Normal and healthy appearance. Well-developed, normal weight and not in distress.   Neck:      Thyroid: Thyroid normal.   Pulmonary:      Effort: Pulmonary effort is " normal.      Breath sounds: Normal breath sounds.   Cardiovascular:      PMI at left midclavicular line. Normal rate. Regular rhythm. Normal S1. Normal S2.       Murmurs: There is no murmur.      No gallop.  No rub.   Pulses:     Intact distal pulses.   Edema:     Peripheral edema absent.   Skin:     General: Skin is warm and dry.   Neurological:      General: No focal deficit present.      Mental Status: Alert.   Psychiatric:         Behavior: Behavior is cooperative.         Diagnostic Data:    ECG 12 Lead    Date/Time: 6/19/2024 11:53 AM  Performed by: Keshia Casey APRN    Authorized by: Keshia Casey APRN  Comparison: compared with previous ECG   Similar to previous ECG  Rhythm: sinus rhythm  Rate: normal  BPM: 85  QRS axis: normal    Clinical impression: normal ECG            LABS:  No recent labs      ASSESSMENT:     Diagnosis Plan   1. Tachycardia  TSH Rfx On Abnormal To Free T4    Comprehensive Metabolic Panel    CBC & Differential    Lipid Panel    Holter Monitor - 72 Hour Up To 15 Days    Adult Transthoracic Echo Complete W/ Cont if Necessary Per Protocol      2. Primary hypertension  Adult Transthoracic Echo Complete W/ Cont if Necessary Per Protocol      3. Palpitations  Holter Monitor - 72 Hour Up To 15 Days    Adult Transthoracic Echo Complete W/ Cont if Necessary Per Protocol      4. History of mitral valve prolapse            PLAN:    Tachycardia, possible A-fib.  EKG normal.  -Check TSH, CBC, CMP  -Place 1 week Holter  -Check echocardiogram with history of MVP    Hypertension, not controlled.  Elevated today, possibly situational.  -She will begin checking home blood pressures and notify the office if consistently above 130/80 for further adjustment of antihypertensive therapy.  Will likely need second agent.    Hyperlipidemia  -Check lipid    Schedule follow up in 6 weeks, or sooner as needed. Encouraged to contact office with any questions or concerns.    Alton Melton MD,  thank you for referring Ms. Nieto for evaluation.  Please do not hesitate to call with any questions.    Keshia Casey, RANDY 06/19/24 11:53 EDT

## 2024-06-19 ENCOUNTER — LAB (OUTPATIENT)
Facility: HOSPITAL | Age: 59
End: 2024-06-19
Payer: COMMERCIAL

## 2024-06-19 ENCOUNTER — OFFICE VISIT (OUTPATIENT)
Dept: CARDIOLOGY | Facility: CLINIC | Age: 59
End: 2024-06-19
Payer: COMMERCIAL

## 2024-06-19 VITALS
OXYGEN SATURATION: 98 % | WEIGHT: 140 LBS | SYSTOLIC BLOOD PRESSURE: 160 MMHG | BODY MASS INDEX: 22.5 KG/M2 | HEIGHT: 66 IN | DIASTOLIC BLOOD PRESSURE: 108 MMHG | HEART RATE: 94 BPM

## 2024-06-19 DIAGNOSIS — Z86.79 HISTORY OF MITRAL VALVE PROLAPSE: ICD-10-CM

## 2024-06-19 DIAGNOSIS — R00.0 TACHYCARDIA: Primary | ICD-10-CM

## 2024-06-19 DIAGNOSIS — I10 PRIMARY HYPERTENSION: ICD-10-CM

## 2024-06-19 DIAGNOSIS — R00.2 PALPITATIONS: ICD-10-CM

## 2024-06-19 DIAGNOSIS — R00.0 TACHYCARDIA: ICD-10-CM

## 2024-06-19 PROCEDURE — 80050 GENERAL HEALTH PANEL: CPT

## 2024-06-19 PROCEDURE — 80061 LIPID PANEL: CPT

## 2024-06-19 PROCEDURE — 36415 COLL VENOUS BLD VENIPUNCTURE: CPT

## 2024-06-20 LAB
ALBUMIN SERPL-MCNC: 4.3 G/DL (ref 3.5–5.2)
ALBUMIN/GLOB SERPL: 1.8 G/DL
ALP SERPL-CCNC: 75 U/L (ref 39–117)
ALT SERPL W P-5'-P-CCNC: 12 U/L (ref 1–33)
ANION GAP SERPL CALCULATED.3IONS-SCNC: 8 MMOL/L (ref 5–15)
AST SERPL-CCNC: 13 U/L (ref 1–32)
BASOPHILS # BLD AUTO: 0.04 10*3/MM3 (ref 0–0.2)
BASOPHILS NFR BLD AUTO: 0.8 % (ref 0–1.5)
BILIRUB SERPL-MCNC: 0.4 MG/DL (ref 0–1.2)
BUN SERPL-MCNC: 18 MG/DL (ref 8–23)
BUN/CREAT SERPL: 20 (ref 7–25)
CALCIUM SPEC-SCNC: 9.4 MG/DL (ref 8.6–10.5)
CHLORIDE SERPL-SCNC: 103 MMOL/L (ref 98–107)
CHOLEST SERPL-MCNC: 217 MG/DL (ref 0–200)
CO2 SERPL-SCNC: 28 MMOL/L (ref 22–29)
CREAT SERPL-MCNC: 0.9 MG/DL (ref 0.57–1)
DEPRECATED RDW RBC AUTO: 40.4 FL (ref 37–54)
EGFRCR SERPLBLD CKD-EPI 2021: 73.3 ML/MIN/1.73
EOSINOPHIL # BLD AUTO: 0.07 10*3/MM3 (ref 0–0.4)
EOSINOPHIL NFR BLD AUTO: 1.4 % (ref 0.3–6.2)
ERYTHROCYTE [DISTWIDTH] IN BLOOD BY AUTOMATED COUNT: 12.3 % (ref 12.3–15.4)
GLOBULIN UR ELPH-MCNC: 2.4 GM/DL
GLUCOSE SERPL-MCNC: 86 MG/DL (ref 65–99)
HCT VFR BLD AUTO: 39.2 % (ref 34–46.6)
HDLC SERPL-MCNC: 63 MG/DL (ref 40–60)
HGB BLD-MCNC: 13.6 G/DL (ref 12–15.9)
IMM GRANULOCYTES # BLD AUTO: 0.01 10*3/MM3 (ref 0–0.05)
IMM GRANULOCYTES NFR BLD AUTO: 0.2 % (ref 0–0.5)
LDLC SERPL CALC-MCNC: 141 MG/DL (ref 0–100)
LDLC/HDLC SERPL: 2.21 {RATIO}
LYMPHOCYTES # BLD AUTO: 1.8 10*3/MM3 (ref 0.7–3.1)
LYMPHOCYTES NFR BLD AUTO: 36.5 % (ref 19.6–45.3)
MCH RBC QN AUTO: 31.6 PG (ref 26.6–33)
MCHC RBC AUTO-ENTMCNC: 34.7 G/DL (ref 31.5–35.7)
MCV RBC AUTO: 91 FL (ref 79–97)
MONOCYTES # BLD AUTO: 0.33 10*3/MM3 (ref 0.1–0.9)
MONOCYTES NFR BLD AUTO: 6.7 % (ref 5–12)
NEUTROPHILS NFR BLD AUTO: 2.68 10*3/MM3 (ref 1.7–7)
NEUTROPHILS NFR BLD AUTO: 54.4 % (ref 42.7–76)
NRBC BLD AUTO-RTO: 0 /100 WBC (ref 0–0.2)
PLATELET # BLD AUTO: 178 10*3/MM3 (ref 140–450)
PMV BLD AUTO: 10.7 FL (ref 6–12)
POTASSIUM SERPL-SCNC: 4.3 MMOL/L (ref 3.5–5.2)
PROT SERPL-MCNC: 6.7 G/DL (ref 6–8.5)
RBC # BLD AUTO: 4.31 10*6/MM3 (ref 3.77–5.28)
SODIUM SERPL-SCNC: 139 MMOL/L (ref 136–145)
TRIGL SERPL-MCNC: 75 MG/DL (ref 0–150)
TSH SERPL DL<=0.05 MIU/L-ACNC: 1.89 UIU/ML (ref 0.27–4.2)
VLDLC SERPL-MCNC: 13 MG/DL (ref 5–40)
WBC NRBC COR # BLD AUTO: 4.93 10*3/MM3 (ref 3.4–10.8)

## 2024-07-03 ENCOUNTER — TELEPHONE (OUTPATIENT)
Dept: CARDIOLOGY | Facility: CLINIC | Age: 59
End: 2024-07-03
Payer: COMMERCIAL

## 2024-07-03 NOTE — TELEPHONE ENCOUNTER
Patient left detailed message per verbal release.  Holter without significant abnormalities in heart rate or rhythm.  Less than 1% PVCs.   Average HR: 89. Min HR: 55. Max HR: 136.     Will await results of echocardiogram  Lab Results   Component Value Date    CHOL 217 (H) 06/19/2024    TRIG 75 06/19/2024    HDL 63 (H) 06/19/2024     (H) 06/19/2024      Lab Results   Component Value Date    WBC 4.93 06/19/2024    HGB 13.6 06/19/2024    HCT 39.2 06/19/2024    MCV 91.0 06/19/2024     06/19/2024      Lab Results   Component Value Date    GLUCOSE 86 06/19/2024    BUN 18 06/19/2024    CREATININE 0.90 06/19/2024    EGFR 73.3 06/19/2024    BCR 20.0 06/19/2024    K 4.3 06/19/2024    CO2 28.0 06/19/2024    CALCIUM 9.4 06/19/2024    ALBUMIN 4.3 06/19/2024    BILITOT 0.4 06/19/2024    AST 13 06/19/2024    ALT 12 06/19/2024      Lab Results   Component Value Date    TSH 1.890 06/19/2024

## 2024-07-31 ENCOUNTER — OFFICE VISIT (OUTPATIENT)
Dept: CARDIOLOGY | Facility: CLINIC | Age: 59
End: 2024-07-31
Payer: COMMERCIAL

## 2024-07-31 VITALS
SYSTOLIC BLOOD PRESSURE: 124 MMHG | HEIGHT: 66 IN | OXYGEN SATURATION: 97 % | HEART RATE: 91 BPM | DIASTOLIC BLOOD PRESSURE: 82 MMHG | BODY MASS INDEX: 23.29 KG/M2 | WEIGHT: 144.9 LBS

## 2024-07-31 DIAGNOSIS — R00.2 PALPITATIONS: Primary | ICD-10-CM

## 2024-07-31 DIAGNOSIS — E78.5 HYPERLIPIDEMIA LDL GOAL <100: ICD-10-CM

## 2024-07-31 DIAGNOSIS — Z86.79 HISTORY OF MITRAL VALVE PROLAPSE: ICD-10-CM

## 2024-07-31 DIAGNOSIS — I10 PRIMARY HYPERTENSION: ICD-10-CM

## 2024-07-31 RX ORDER — CETIRIZINE HYDROCHLORIDE 5 MG/1
5 TABLET ORAL DAILY
COMMUNITY

## 2024-07-31 NOTE — PROGRESS NOTES
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, Nancy Bernard  Previous cardiology: RASHIDA Tucker (~20 years)    PROBLEM LIST:   Palpitations   Holter: 55-, < 1% PAC/PVC  Echo pending  HTN  HLD   930-41-  History of mitral valve prolapse  History of histoplasmosis   Chronic cough/hoarseness  Negative ENT eval   CXR: no acute findings  Asthma  COVID early   Hypothyroidism     Surgical history:  Tonsillectomy/adenoidectomy   Cholecystectomy   Tubal ligation  C section  D&C        CC:   Chief Complaint   Patient presents with    Rapid Heart Rate       Allergies  Allergies   Allergen Reactions    Penicillins Hives       Current Medications  Current Outpatient Medications   Medication Instructions    albuterol sulfate  (90 Base) MCG/ACT inhaler 2 puffs, Inhalation, Every 4 Hours PRN    cetirizine (ZYRTEC) 5 mg, Oral, Daily    Fluticasone-Salmeterol (ADVAIR HFA IN) Inhalation, As Needed    losartan (COZAAR) 50 mg, Oral, Daily    montelukast (SINGULAIR) 10 MG tablet 1 tablet, Oral, Daily        History of Present Illness   Yanira Nieto is a 59 y.o. year old female here for follow up.  She denies any worsening symptoms.  She has returned from vacation in [place] and has felt better.  She is not checking home blood pressures regularly.  Holter did not reveal any significant abnormalities.  She has not had echocardiogram.  Labs were benign except for elevated cholesterol and LDL.  She did undergo allergy testing with ENT and has follow-up next week.  She continues to have cough and hoarseness.    Pt denies any chest pain, orthopnea, PND, lower extremity edema, or claudication.      OBJECTIVE:  Vitals:    24 1419   BP:  "124/82   BP Location: Left arm   Patient Position: Sitting   Cuff Size: Adult   Pulse: 91   SpO2: 97%   Weight: 65.7 kg (144 lb 14.4 oz)   Height: 167.6 cm (65.98\")     Body mass index is 23.4 kg/m².    Constitutional:       Appearance: Normal and healthy appearance. Well-developed.   Pulmonary:      Effort: Pulmonary effort is normal.      Breath sounds: Normal breath sounds.   Cardiovascular:      Normal rate. Regular rhythm. Normal S1. Normal S2.       Murmurs: There is no murmur.   Skin:     General: Skin is warm and dry.   Neurological:      General: No focal deficit present.      Mental Status: Alert.   Psychiatric:         Behavior: Behavior is cooperative.         Diagnostic Data:  Lab Results   Component Value Date    CHOL 217 (H) 06/19/2024    TRIG 75 06/19/2024    HDL 63 (H) 06/19/2024     (H) 06/19/2024      Lab Results   Component Value Date    WBC 4.93 06/19/2024    HGB 13.6 06/19/2024    HCT 39.2 06/19/2024    MCV 91.0 06/19/2024     06/19/2024     Lab Results   Component Value Date    GLUCOSE 86 06/19/2024    BUN 18 06/19/2024    CREATININE 0.90 06/19/2024    EGFR 73.3 06/19/2024    BCR 20.0 06/19/2024    K 4.3 06/19/2024    CO2 28.0 06/19/2024    CALCIUM 9.4 06/19/2024    ALBUMIN 4.3 06/19/2024    BILITOT 0.4 06/19/2024    AST 13 06/19/2024    ALT 12 06/19/2024     Lab Results   Component Value Date    TSH 1.890 06/19/2024      Procedures    Advance Care Planning   ACP discussion was declined by the patient. Patient does not have an advance directive, declines further assistance.         ASSESSMENT:   Diagnosis Plan   1. Palpitations        2. Hyperlipidemia LDL goal <100  CT Cardiac Calcium Score Without Dye      3. Primary hypertension        4. History of mitral valve prolapse            PLAN:  Palpitations, Holter did not show any significant abnormalities in heart rate or rhythm.  Labs benign.  -She will complete echocardiogram due to history of MVP and ongoing shortness of " breath    Shortness of breath, cough, hoarseness-under evaluation by ENT  -May benefit from formal pulmonary evaluation/PFTs    Hypertension, controlled today  -She will continue to follow home blood pressures and notify office if readings elevated above 130/80 consistently.  -Continue losartan    Hyperlipidemia, .  -Will delineate need for lipid modification with CT cardiac calcium score.  -She will schedule this at her convenience.    Results will be called to her.  Follow up in 1 year, or sooner as needed. Encouraged to contact office with any questions or concerns.    Keshia Casey, RANDY 07/31/24 14:57 EDT

## 2024-11-22 ENCOUNTER — HOSPITAL ENCOUNTER (OUTPATIENT)
Age: 59
Discharge: HOME | End: 2024-11-22
Payer: COMMERCIAL

## 2024-11-22 DIAGNOSIS — R14.0: ICD-10-CM

## 2024-11-22 DIAGNOSIS — R10.31: Primary | ICD-10-CM

## 2024-11-22 DIAGNOSIS — R10.32: ICD-10-CM

## 2024-11-22 LAB
BUN SERPL-MCNC: 17 MG/DL (ref 7–17)
CREAT BLD-SCNC: 0.9 MG/DL (ref 0.52–1.04)
ESTIMATED GLOMERULAR FILT RATE: 64 ML/MIN (ref 60–?)
GFR (AFRICAN AMERICAN): 77 ML/MIN (ref 60–?)

## 2024-11-22 PROCEDURE — 84520 ASSAY OF UREA NITROGEN: CPT

## 2024-11-22 PROCEDURE — 74178 CT ABD&PLV WO CNTR FLWD CNTR: CPT

## 2024-11-22 PROCEDURE — 82565 ASSAY OF CREATININE: CPT

## 2024-11-22 PROCEDURE — 36415 COLL VENOUS BLD VENIPUNCTURE: CPT

## 2024-11-25 ENCOUNTER — HOSPITAL ENCOUNTER (OUTPATIENT)
Age: 59
Discharge: HOME | End: 2024-11-25
Payer: COMMERCIAL

## 2024-11-25 VITALS
DIASTOLIC BLOOD PRESSURE: 71 MMHG | SYSTOLIC BLOOD PRESSURE: 123 MMHG | OXYGEN SATURATION: 99 % | RESPIRATION RATE: 16 BRPM | HEART RATE: 75 BPM

## 2024-11-25 VITALS
HEART RATE: 91 BPM | RESPIRATION RATE: 18 BRPM | OXYGEN SATURATION: 99 % | DIASTOLIC BLOOD PRESSURE: 84 MMHG | SYSTOLIC BLOOD PRESSURE: 131 MMHG | TEMPERATURE: 97.9 F

## 2024-11-25 VITALS
OXYGEN SATURATION: 98 % | TEMPERATURE: 98 F | RESPIRATION RATE: 16 BRPM | HEART RATE: 74 BPM | DIASTOLIC BLOOD PRESSURE: 53 MMHG | SYSTOLIC BLOOD PRESSURE: 102 MMHG

## 2024-11-25 VITALS
OXYGEN SATURATION: 98 % | RESPIRATION RATE: 16 BRPM | DIASTOLIC BLOOD PRESSURE: 80 MMHG | SYSTOLIC BLOOD PRESSURE: 107 MMHG | HEART RATE: 76 BPM

## 2024-11-25 VITALS
HEART RATE: 79 BPM | DIASTOLIC BLOOD PRESSURE: 86 MMHG | RESPIRATION RATE: 16 BRPM | SYSTOLIC BLOOD PRESSURE: 128 MMHG | OXYGEN SATURATION: 100 %

## 2024-11-25 VITALS — OXYGEN SATURATION: 100 %

## 2024-11-25 VITALS — BODY MASS INDEX: 23.3 KG/M2

## 2024-11-25 DIAGNOSIS — Z80.0: ICD-10-CM

## 2024-11-25 DIAGNOSIS — R15.0: Primary | ICD-10-CM

## 2024-11-25 DIAGNOSIS — K57.30: ICD-10-CM

## 2024-11-25 DIAGNOSIS — K63.5: ICD-10-CM

## 2024-11-25 DIAGNOSIS — K64.8: ICD-10-CM

## 2024-11-25 PROCEDURE — 45382 COLONOSCOPY W/CONTROL BLEED: CPT

## 2024-11-25 PROCEDURE — 45385 COLONOSCOPY W/LESION REMOVAL: CPT
